# Patient Record
Sex: FEMALE | Race: WHITE | NOT HISPANIC OR LATINO
[De-identification: names, ages, dates, MRNs, and addresses within clinical notes are randomized per-mention and may not be internally consistent; named-entity substitution may affect disease eponyms.]

---

## 2022-01-03 ENCOUNTER — APPOINTMENT (OUTPATIENT)
Dept: INTERNAL MEDICINE | Facility: CLINIC | Age: 37
End: 2022-01-03
Payer: COMMERCIAL

## 2022-01-03 ENCOUNTER — TRANSCRIPTION ENCOUNTER (OUTPATIENT)
Age: 37
End: 2022-01-03

## 2022-01-03 VITALS — WEIGHT: 125 LBS | BODY MASS INDEX: 20.09 KG/M2 | HEIGHT: 66 IN

## 2022-01-03 VITALS
HEART RATE: 90 BPM | DIASTOLIC BLOOD PRESSURE: 62 MMHG | RESPIRATION RATE: 16 BRPM | SYSTOLIC BLOOD PRESSURE: 90 MMHG | OXYGEN SATURATION: 98 %

## 2022-01-03 DIAGNOSIS — Z78.9 OTHER SPECIFIED HEALTH STATUS: ICD-10-CM

## 2022-01-03 DIAGNOSIS — Z82.0 FAMILY HISTORY OF EPILEPSY AND OTHER DISEASES OF THE NERVOUS SYSTEM: ICD-10-CM

## 2022-01-03 PROCEDURE — 99385 PREV VISIT NEW AGE 18-39: CPT

## 2022-01-03 PROCEDURE — 36415 COLL VENOUS BLD VENIPUNCTURE: CPT

## 2022-01-03 NOTE — HISTORY OF PRESENT ILLNESS
[de-identified] : This is a 37 yo f here to establish care.\par Currently undergoing IVF. \par Started on levothyroxine.  as pasrt of her treatment\par takes a lot of vitamins- prenatal    \par \par a lot of stress with work.  \par healthy diet\par walks a lot\par used to play squash regularly.

## 2022-01-04 LAB
25(OH)D3 SERPL-MCNC: 43.6 NG/ML
ALBUMIN SERPL ELPH-MCNC: 4.9 G/DL
ALP BLD-CCNC: 34 U/L
ALT SERPL-CCNC: 22 U/L
ANION GAP SERPL CALC-SCNC: 16 MMOL/L
AST SERPL-CCNC: 15 U/L
BASOPHILS # BLD AUTO: 0.03 K/UL
BASOPHILS NFR BLD AUTO: 0.5 %
BILIRUB SERPL-MCNC: 0.5 MG/DL
BUN SERPL-MCNC: 7 MG/DL
CALCIUM SERPL-MCNC: 9.6 MG/DL
CHLORIDE SERPL-SCNC: 102 MMOL/L
CHOLEST SERPL-MCNC: 189 MG/DL
CO2 SERPL-SCNC: 23 MMOL/L
CREAT SERPL-MCNC: 0.71 MG/DL
EOSINOPHIL # BLD AUTO: 0.04 K/UL
EOSINOPHIL NFR BLD AUTO: 0.7 %
ESTIMATED AVERAGE GLUCOSE: 94 MG/DL
GLUCOSE SERPL-MCNC: 92 MG/DL
HBA1C MFR BLD HPLC: 4.9 %
HCT VFR BLD CALC: 39.9 %
HDLC SERPL-MCNC: 72 MG/DL
HGB BLD-MCNC: 12.9 G/DL
IMM GRANULOCYTES NFR BLD AUTO: 0.2 %
LDLC SERPL CALC-MCNC: 89 MG/DL
LYMPHOCYTES # BLD AUTO: 1.42 K/UL
LYMPHOCYTES NFR BLD AUTO: 23.7 %
MAN DIFF?: NORMAL
MCHC RBC-ENTMCNC: 30.6 PG
MCHC RBC-ENTMCNC: 32.3 GM/DL
MCV RBC AUTO: 94.8 FL
MONOCYTES # BLD AUTO: 0.3 K/UL
MONOCYTES NFR BLD AUTO: 5 %
NEUTROPHILS # BLD AUTO: 4.19 K/UL
NEUTROPHILS NFR BLD AUTO: 69.9 %
NONHDLC SERPL-MCNC: 117 MG/DL
PLATELET # BLD AUTO: 185 K/UL
POTASSIUM SERPL-SCNC: 4 MMOL/L
PROT SERPL-MCNC: 7.3 G/DL
RBC # BLD: 4.21 M/UL
RBC # FLD: 13.7 %
SODIUM SERPL-SCNC: 141 MMOL/L
TRIGL SERPL-MCNC: 141 MG/DL
WBC # FLD AUTO: 5.99 K/UL

## 2022-01-10 ENCOUNTER — TRANSCRIPTION ENCOUNTER (OUTPATIENT)
Age: 37
End: 2022-01-10

## 2022-04-29 ENCOUNTER — TRANSCRIPTION ENCOUNTER (OUTPATIENT)
Age: 37
End: 2022-04-29

## 2023-03-01 ENCOUNTER — INPATIENT (INPATIENT)
Facility: HOSPITAL | Age: 38
LOS: 2 days | Discharge: HOME | End: 2023-03-04
Attending: OBSTETRICS & GYNECOLOGY | Admitting: OBSTETRICS & GYNECOLOGY
Payer: COMMERCIAL

## 2023-03-02 VITALS — HEIGHT: 66 IN | WEIGHT: 156.97 LBS

## 2023-03-02 LAB
BASOPHILS # BLD AUTO: 0.02 K/UL — SIGNIFICANT CHANGE UP (ref 0–0.2)
BASOPHILS # BLD AUTO: 0.02 K/UL — SIGNIFICANT CHANGE UP (ref 0–0.2)
BASOPHILS NFR BLD AUTO: 0.1 % — SIGNIFICANT CHANGE UP (ref 0–2)
BASOPHILS NFR BLD AUTO: 0.2 % — SIGNIFICANT CHANGE UP (ref 0–2)
BLD GP AB SCN SERPL QL: POSITIVE — SIGNIFICANT CHANGE UP
COVID-19 SPIKE DOMAIN AB INTERP: POSITIVE
COVID-19 SPIKE DOMAIN ANTIBODY RESULT: >250 U/ML — HIGH
EOSINOPHIL # BLD AUTO: 0.01 K/UL — SIGNIFICANT CHANGE UP (ref 0–0.5)
EOSINOPHIL # BLD AUTO: 0.17 K/UL — SIGNIFICANT CHANGE UP (ref 0–0.5)
EOSINOPHIL NFR BLD AUTO: 0.1 % — SIGNIFICANT CHANGE UP (ref 0–6)
EOSINOPHIL NFR BLD AUTO: 1.9 % — SIGNIFICANT CHANGE UP (ref 0–6)
HBV SURFACE AG SER-ACNC: SIGNIFICANT CHANGE UP
HCT VFR BLD CALC: 31.5 % — LOW (ref 34.5–45)
HCT VFR BLD CALC: 36.5 % — SIGNIFICANT CHANGE UP (ref 34.5–45)
HGB BLD-MCNC: 10.6 G/DL — LOW (ref 11.5–15.5)
HGB BLD-MCNC: 12.4 G/DL — SIGNIFICANT CHANGE UP (ref 11.5–15.5)
IMM GRANULOCYTES NFR BLD AUTO: 0.4 % — SIGNIFICANT CHANGE UP (ref 0–0.9)
IMM GRANULOCYTES NFR BLD AUTO: 0.7 % — SIGNIFICANT CHANGE UP (ref 0–0.9)
LYMPHOCYTES # BLD AUTO: 0.79 K/UL — LOW (ref 1–3.3)
LYMPHOCYTES # BLD AUTO: 2.26 K/UL — SIGNIFICANT CHANGE UP (ref 1–3.3)
LYMPHOCYTES # BLD AUTO: 25.9 % — SIGNIFICANT CHANGE UP (ref 13–44)
LYMPHOCYTES # BLD AUTO: 4.6 % — LOW (ref 13–44)
MCHC RBC-ENTMCNC: 31.8 PG — SIGNIFICANT CHANGE UP (ref 27–34)
MCHC RBC-ENTMCNC: 32 PG — SIGNIFICANT CHANGE UP (ref 27–34)
MCHC RBC-ENTMCNC: 33.7 GM/DL — SIGNIFICANT CHANGE UP (ref 32–36)
MCHC RBC-ENTMCNC: 34 GM/DL — SIGNIFICANT CHANGE UP (ref 32–36)
MCV RBC AUTO: 94.1 FL — SIGNIFICANT CHANGE UP (ref 80–100)
MCV RBC AUTO: 94.6 FL — SIGNIFICANT CHANGE UP (ref 80–100)
MONOCYTES # BLD AUTO: 0.6 K/UL — SIGNIFICANT CHANGE UP (ref 0–0.9)
MONOCYTES # BLD AUTO: 0.7 K/UL — SIGNIFICANT CHANGE UP (ref 0–0.9)
MONOCYTES NFR BLD AUTO: 4.1 % — SIGNIFICANT CHANGE UP (ref 2–14)
MONOCYTES NFR BLD AUTO: 6.9 % — SIGNIFICANT CHANGE UP (ref 2–14)
NEUTROPHILS # BLD AUTO: 15.52 K/UL — HIGH (ref 1.8–7.4)
NEUTROPHILS # BLD AUTO: 5.63 K/UL — SIGNIFICANT CHANGE UP (ref 1.8–7.4)
NEUTROPHILS NFR BLD AUTO: 64.4 % — SIGNIFICANT CHANGE UP (ref 43–77)
NEUTROPHILS NFR BLD AUTO: 90.7 % — HIGH (ref 43–77)
NRBC # BLD: 0 /100 WBCS — SIGNIFICANT CHANGE UP (ref 0–0)
NRBC # BLD: 0 /100 WBCS — SIGNIFICANT CHANGE UP (ref 0–0)
PLATELET # BLD AUTO: 169 K/UL — SIGNIFICANT CHANGE UP (ref 150–400)
PLATELET # BLD AUTO: 206 K/UL — SIGNIFICANT CHANGE UP (ref 150–400)
RBC # BLD: 3.33 M/UL — LOW (ref 3.8–5.2)
RBC # BLD: 3.88 M/UL — SIGNIFICANT CHANGE UP (ref 3.8–5.2)
RBC # FLD: 13 % — SIGNIFICANT CHANGE UP (ref 10.3–14.5)
RBC # FLD: 13.1 % — SIGNIFICANT CHANGE UP (ref 10.3–14.5)
RH IG SCN BLD-IMP: NEGATIVE — SIGNIFICANT CHANGE UP
RH IG SCN BLD-IMP: NEGATIVE — SIGNIFICANT CHANGE UP
SARS-COV-2 IGG+IGM SERPL QL IA: >250 U/ML — HIGH
SARS-COV-2 IGG+IGM SERPL QL IA: POSITIVE
T PALLIDUM AB TITR SER: NEGATIVE — SIGNIFICANT CHANGE UP
WBC # BLD: 17.1 K/UL — HIGH (ref 3.8–10.5)
WBC # BLD: 8.74 K/UL — SIGNIFICANT CHANGE UP (ref 3.8–10.5)
WBC # FLD AUTO: 17.1 K/UL — HIGH (ref 3.8–10.5)
WBC # FLD AUTO: 8.74 K/UL — SIGNIFICANT CHANGE UP (ref 3.8–10.5)

## 2023-03-02 PROCEDURE — 86077 PHYS BLOOD BANK SERV XMATCH: CPT

## 2023-03-02 RX ORDER — OXYTOCIN 10 UNIT/ML
2 VIAL (ML) INJECTION
Qty: 30 | Refills: 0 | Status: DISCONTINUED | OUTPATIENT
Start: 2023-03-02 | End: 2023-03-02

## 2023-03-02 RX ORDER — LANOLIN
1 OINTMENT (GRAM) TOPICAL EVERY 6 HOURS
Refills: 0 | Status: DISCONTINUED | OUTPATIENT
Start: 2023-03-02 | End: 2023-03-04

## 2023-03-02 RX ORDER — CEFAZOLIN SODIUM 1 G
2000 VIAL (EA) INJECTION ONCE
Refills: 0 | Status: COMPLETED | OUTPATIENT
Start: 2023-03-02 | End: 2023-03-02

## 2023-03-02 RX ORDER — ACETAMINOPHEN 500 MG
1000 TABLET ORAL ONCE
Refills: 0 | Status: COMPLETED | OUTPATIENT
Start: 2023-03-02 | End: 2023-03-02

## 2023-03-02 RX ORDER — AZITHROMYCIN 500 MG/1
500 TABLET, FILM COATED ORAL ONCE
Refills: 0 | Status: COMPLETED | OUTPATIENT
Start: 2023-03-02 | End: 2023-03-02

## 2023-03-02 RX ORDER — CHLORHEXIDINE GLUCONATE 213 G/1000ML
1 SOLUTION TOPICAL ONCE
Refills: 0 | Status: DISCONTINUED | OUTPATIENT
Start: 2023-03-02 | End: 2023-03-02

## 2023-03-02 RX ORDER — OXYTOCIN 10 UNIT/ML
333.33 VIAL (ML) INJECTION
Qty: 20 | Refills: 0 | Status: DISCONTINUED | OUTPATIENT
Start: 2023-03-02 | End: 2023-03-04

## 2023-03-02 RX ORDER — OXYCODONE HYDROCHLORIDE 5 MG/1
5 TABLET ORAL ONCE
Refills: 0 | Status: DISCONTINUED | OUTPATIENT
Start: 2023-03-02 | End: 2023-03-04

## 2023-03-02 RX ORDER — CHLORHEXIDINE GLUCONATE 213 G/1000ML
1 SOLUTION TOPICAL ONCE
Refills: 0 | Status: COMPLETED | OUTPATIENT
Start: 2023-03-02 | End: 2023-03-02

## 2023-03-02 RX ORDER — KETOROLAC TROMETHAMINE 30 MG/ML
30 SYRINGE (ML) INJECTION EVERY 6 HOURS
Refills: 0 | Status: DISCONTINUED | OUTPATIENT
Start: 2023-03-02 | End: 2023-03-03

## 2023-03-02 RX ORDER — TETANUS TOXOID, REDUCED DIPHTHERIA TOXOID AND ACELLULAR PERTUSSIS VACCINE, ADSORBED 5; 2.5; 8; 8; 2.5 [IU]/.5ML; [IU]/.5ML; UG/.5ML; UG/.5ML; UG/.5ML
0.5 SUSPENSION INTRAMUSCULAR ONCE
Refills: 0 | Status: DISCONTINUED | OUTPATIENT
Start: 2023-03-02 | End: 2023-03-04

## 2023-03-02 RX ORDER — IBUPROFEN 200 MG
600 TABLET ORAL EVERY 6 HOURS
Refills: 0 | Status: COMPLETED | OUTPATIENT
Start: 2023-03-02 | End: 2024-01-29

## 2023-03-02 RX ORDER — OXYCODONE HYDROCHLORIDE 5 MG/1
5 TABLET ORAL
Refills: 0 | Status: DISCONTINUED | OUTPATIENT
Start: 2023-03-02 | End: 2023-03-04

## 2023-03-02 RX ORDER — MAGNESIUM HYDROXIDE 400 MG/1
30 TABLET, CHEWABLE ORAL
Refills: 0 | Status: DISCONTINUED | OUTPATIENT
Start: 2023-03-02 | End: 2023-03-04

## 2023-03-02 RX ORDER — CITRIC ACID/SODIUM CITRATE 300-500 MG
30 SOLUTION, ORAL ORAL ONCE
Refills: 0 | Status: COMPLETED | OUTPATIENT
Start: 2023-03-02 | End: 2023-03-02

## 2023-03-02 RX ORDER — OXYTOCIN 10 UNIT/ML
2 VIAL (ML) INJECTION
Qty: 30 | Refills: 0 | Status: DISCONTINUED | OUTPATIENT
Start: 2023-03-02 | End: 2023-03-04

## 2023-03-02 RX ORDER — DIPHENHYDRAMINE HCL 50 MG
25 CAPSULE ORAL EVERY 6 HOURS
Refills: 0 | Status: DISCONTINUED | OUTPATIENT
Start: 2023-03-02 | End: 2023-03-04

## 2023-03-02 RX ORDER — SIMETHICONE 80 MG/1
80 TABLET, CHEWABLE ORAL EVERY 4 HOURS
Refills: 0 | Status: DISCONTINUED | OUTPATIENT
Start: 2023-03-02 | End: 2023-03-04

## 2023-03-02 RX ORDER — CITRIC ACID/SODIUM CITRATE 300-500 MG
15 SOLUTION, ORAL ORAL EVERY 6 HOURS
Refills: 0 | Status: DISCONTINUED | OUTPATIENT
Start: 2023-03-02 | End: 2023-03-02

## 2023-03-02 RX ORDER — SODIUM CHLORIDE 9 MG/ML
1000 INJECTION, SOLUTION INTRAVENOUS
Refills: 0 | Status: DISCONTINUED | OUTPATIENT
Start: 2023-03-02 | End: 2023-03-02

## 2023-03-02 RX ORDER — SODIUM CHLORIDE 9 MG/ML
1000 INJECTION, SOLUTION INTRAVENOUS
Refills: 0 | Status: DISCONTINUED | OUTPATIENT
Start: 2023-03-02 | End: 2023-03-04

## 2023-03-02 RX ORDER — ACETAMINOPHEN 500 MG
975 TABLET ORAL
Refills: 0 | Status: DISCONTINUED | OUTPATIENT
Start: 2023-03-02 | End: 2023-03-04

## 2023-03-02 RX ADMIN — CHLORHEXIDINE GLUCONATE 1 APPLICATION(S): 213 SOLUTION TOPICAL at 15:00

## 2023-03-02 RX ADMIN — Medication 400 MILLIGRAM(S): at 20:46

## 2023-03-02 RX ADMIN — SODIUM CHLORIDE 125 MILLILITER(S): 9 INJECTION, SOLUTION INTRAVENOUS at 22:50

## 2023-03-02 RX ADMIN — SODIUM CHLORIDE 125 MILLILITER(S): 9 INJECTION, SOLUTION INTRAVENOUS at 01:55

## 2023-03-02 RX ADMIN — Medication 30 MILLIGRAM(S): at 23:44

## 2023-03-02 RX ADMIN — Medication 30 MILLILITER(S): at 16:10

## 2023-03-02 RX ADMIN — AZITHROMYCIN 255 MILLIGRAM(S): 500 TABLET, FILM COATED ORAL at 15:25

## 2023-03-02 RX ADMIN — Medication 2 MILLIUNIT(S)/MIN: at 02:15

## 2023-03-02 RX ADMIN — Medication 100 MILLIGRAM(S): at 15:00

## 2023-03-02 RX ADMIN — Medication 30 MILLIGRAM(S): at 17:34

## 2023-03-02 RX ADMIN — Medication 1000 MILLIUNIT(S)/MIN: at 18:06

## 2023-03-02 NOTE — PRE-ANESTHESIA EVALUATION ADULT - NSANTHOSAYNRD_GEN_A_CORE
No. ALBETR screening performed.  STOP BANG Legend: 0-2 = LOW Risk; 3-4 = INTERMEDIATE Risk; 5-8 = HIGH Risk

## 2023-03-02 NOTE — PATIENT PROFILE OB - FALL HARM RISK - UNIVERSAL INTERVENTIONS
Bed in lowest position, wheels locked, appropriate side rails in place/Call bell, personal items and telephone in reach/Instruct patient to call for assistance before getting out of bed or chair/Non-slip footwear when patient is out of bed/[place] to call system/Physically safe environment - no spills, clutter or unnecessary equipment/Purposeful Proactive Rounding/Room/bathroom lighting operational, light cord in reach

## 2023-03-03 ENCOUNTER — TRANSCRIPTION ENCOUNTER (OUTPATIENT)
Age: 38
End: 2023-03-03

## 2023-03-03 LAB
BASOPHILS # BLD AUTO: 0.02 K/UL — SIGNIFICANT CHANGE UP (ref 0–0.2)
BASOPHILS NFR BLD AUTO: 0.1 % — SIGNIFICANT CHANGE UP (ref 0–2)
EOSINOPHIL # BLD AUTO: 0.02 K/UL — SIGNIFICANT CHANGE UP (ref 0–0.5)
EOSINOPHIL NFR BLD AUTO: 0.1 % — SIGNIFICANT CHANGE UP (ref 0–6)
HCT VFR BLD CALC: 23.4 % — LOW (ref 34.5–45)
HGB BLD-MCNC: 7.9 G/DL — LOW (ref 11.5–15.5)
IMM GRANULOCYTES NFR BLD AUTO: 0.6 % — SIGNIFICANT CHANGE UP (ref 0–0.9)
KLEIHAUER-BETKE CALCULATION: 0 % — SIGNIFICANT CHANGE UP (ref 0–0.3)
LYMPHOCYTES # BLD AUTO: 1.93 K/UL — SIGNIFICANT CHANGE UP (ref 1–3.3)
LYMPHOCYTES # BLD AUTO: 12.7 % — LOW (ref 13–44)
MCHC RBC-ENTMCNC: 32 PG — SIGNIFICANT CHANGE UP (ref 27–34)
MCHC RBC-ENTMCNC: 33.8 GM/DL — SIGNIFICANT CHANGE UP (ref 32–36)
MCV RBC AUTO: 94.7 FL — SIGNIFICANT CHANGE UP (ref 80–100)
MONOCYTES # BLD AUTO: 0.95 K/UL — HIGH (ref 0–0.9)
MONOCYTES NFR BLD AUTO: 6.3 % — SIGNIFICANT CHANGE UP (ref 2–14)
NEUTROPHILS # BLD AUTO: 12.14 K/UL — HIGH (ref 1.8–7.4)
NEUTROPHILS NFR BLD AUTO: 80.2 % — HIGH (ref 43–77)
NRBC # BLD: 0 /100 WBCS — SIGNIFICANT CHANGE UP (ref 0–0)
PLATELET # BLD AUTO: 157 K/UL — SIGNIFICANT CHANGE UP (ref 150–400)
RBC # BLD: 2.47 M/UL — LOW (ref 3.8–5.2)
RBC # FLD: 13.2 % — SIGNIFICANT CHANGE UP (ref 10.3–14.5)
RUBV IGG SER-ACNC: 1.4 INDEX — SIGNIFICANT CHANGE UP
RUBV IGG SER-IMP: POSITIVE — SIGNIFICANT CHANGE UP
WBC # BLD: 15.15 K/UL — HIGH (ref 3.8–10.5)
WBC # FLD AUTO: 15.15 K/UL — HIGH (ref 3.8–10.5)

## 2023-03-03 RX ORDER — IBUPROFEN 200 MG
1 TABLET ORAL
Qty: 0 | Refills: 0 | DISCHARGE
Start: 2023-03-03

## 2023-03-03 RX ORDER — IRON SUCROSE 20 MG/ML
300 INJECTION, SOLUTION INTRAVENOUS EVERY 24 HOURS
Refills: 0 | Status: DISCONTINUED | OUTPATIENT
Start: 2023-03-03 | End: 2023-03-04

## 2023-03-03 RX ORDER — ASCORBIC ACID 60 MG
500 TABLET,CHEWABLE ORAL DAILY
Refills: 0 | Status: DISCONTINUED | OUTPATIENT
Start: 2023-03-03 | End: 2023-03-04

## 2023-03-03 RX ORDER — FOLIC ACID 0.8 MG
0.5 TABLET ORAL DAILY
Refills: 0 | Status: DISCONTINUED | OUTPATIENT
Start: 2023-03-03 | End: 2023-03-04

## 2023-03-03 RX ORDER — IBUPROFEN 200 MG
600 TABLET ORAL EVERY 6 HOURS
Refills: 0 | Status: DISCONTINUED | OUTPATIENT
Start: 2023-03-03 | End: 2023-03-04

## 2023-03-03 RX ORDER — ACETAMINOPHEN 500 MG
3 TABLET ORAL
Qty: 0 | Refills: 0 | DISCHARGE
Start: 2023-03-03

## 2023-03-03 RX ORDER — LIDOCAINE 4 G/100G
1 CREAM TOPICAL ONCE
Refills: 0 | Status: COMPLETED | OUTPATIENT
Start: 2023-03-03 | End: 2023-03-03

## 2023-03-03 RX ORDER — PREGABALIN 225 MG/1
1000 CAPSULE ORAL ONCE
Refills: 0 | Status: COMPLETED | OUTPATIENT
Start: 2023-03-03 | End: 2023-03-03

## 2023-03-03 RX ADMIN — Medication 1 APPLICATION(S): at 05:26

## 2023-03-03 RX ADMIN — Medication 975 MILLIGRAM(S): at 15:47

## 2023-03-03 RX ADMIN — Medication 0.5 MILLIGRAM(S): at 15:49

## 2023-03-03 RX ADMIN — Medication 30 MILLIGRAM(S): at 13:30

## 2023-03-03 RX ADMIN — Medication 30 MILLIGRAM(S): at 12:48

## 2023-03-03 RX ADMIN — LIDOCAINE 1 PATCH: 4 CREAM TOPICAL at 18:50

## 2023-03-03 RX ADMIN — Medication 600 MILLIGRAM(S): at 19:30

## 2023-03-03 RX ADMIN — IRON SUCROSE 176.67 MILLIGRAM(S): 20 INJECTION, SOLUTION INTRAVENOUS at 15:52

## 2023-03-03 RX ADMIN — Medication 30 MILLIGRAM(S): at 00:38

## 2023-03-03 RX ADMIN — Medication 975 MILLIGRAM(S): at 21:32

## 2023-03-03 RX ADMIN — Medication 975 MILLIGRAM(S): at 03:00

## 2023-03-03 RX ADMIN — Medication 500 MILLIGRAM(S): at 18:45

## 2023-03-03 RX ADMIN — Medication 975 MILLIGRAM(S): at 10:32

## 2023-03-03 RX ADMIN — Medication 975 MILLIGRAM(S): at 16:30

## 2023-03-03 RX ADMIN — Medication 975 MILLIGRAM(S): at 02:04

## 2023-03-03 RX ADMIN — Medication 975 MILLIGRAM(S): at 22:30

## 2023-03-03 RX ADMIN — Medication 600 MILLIGRAM(S): at 18:43

## 2023-03-03 RX ADMIN — LIDOCAINE 1 PATCH: 4 CREAM TOPICAL at 18:49

## 2023-03-03 RX ADMIN — Medication 975 MILLIGRAM(S): at 09:41

## 2023-03-03 RX ADMIN — Medication 30 MILLIGRAM(S): at 05:26

## 2023-03-03 RX ADMIN — PREGABALIN 1000 MICROGRAM(S): 225 CAPSULE ORAL at 18:48

## 2023-03-03 NOTE — DISCHARGE NOTE OB - NS MD DC FALL RISK RISK
For information on Fall & Injury Prevention, visit: [email or web address] OR  [email or web address] OR  https://www.cdc.gov/steadi/patient.html

## 2023-03-03 NOTE — DISCHARGE NOTE OB - CARE PLAN
1 Principal Discharge DX:	 delivery delivered  Assessment and plan of treatment:	 delivery, meeting all postoperative milestones.  Please follow-up with your OB doctor within 1-2 weeks.  You can resume a regular diet at home and may continue your prenatal vitamins as directed.  Please place nothing in the vagina for 6 weeks (no tampons, sex, douching, tub baths, swimming pools, etc).  If you have severe headaches and/or vision changes, heavy bleeding, or chest pain, please call your provider or go to the nearest Emergency Department.  Please call your OB with any signs of symptoms of infection including fever > 100.4 degrees, severe pain, malodorous vaginal discharge or heavy bleeding requiring more than 1-2 pads/hour.  You can take Motrin 600mg orally every 6 hours for pain as needed.  Secondary Diagnosis:	Acute postoperative anemia due to expected blood loss

## 2023-03-03 NOTE — CHART NOTE - NSCHARTNOTEFT_GEN_A_CORE
Alerted by primary RN that patient is c/o neck pain.  She is POD#1 s/p primary  section for arrest of dilation c/o neck pain since delivery, worse this morning and today.  Pain is constant and non-radiating.  It is worsened with movement and she refers to it as "debilitating."  Heat packs and oral pain medications improve the pain only temporarily.  Denies headache, neck injury, hx of neck/back pain, dizziness, chest pain, SOB, back pain.    VS wnl  Vital Signs Last 24 Hrs  T(C): 36.8 (03 Mar 2023 05:48), Max: 36.9 (03 Mar 2023 01:59)  T(F): 98.3 (03 Mar 2023 05:48), Max: 98.4 (03 Mar 2023 01:59)  HR: 68 (03 Mar 2023 05:48) (64 - 84)  BP: 96/62 (03 Mar 2023 05:48) (96/62 - 125/77)  BP(mean): --  RR: 18 (03 Mar 2023 05:48) (12 - 18)  SpO2: 97% (03 Mar 2023 05:48) (95% - 100%)    Gen: patient looks well in bed in NAD  MS: no edema, erythema, or tenderness to the cervical spine or paravertebral muscles        Likely a strained muscle due to tension/stress.  Suggested topical lidocaine patch or Flexeril for severe pain as needed.  Will follow up with attending.

## 2023-03-03 NOTE — PROGRESS NOTE ADULT - ASSESSMENT
A/P   37y  s/p  section c/b PPH (EBL 1200), POD #1, stable  - PPH: s/p methergine intra-op, bleeding well controlled this AM  -  Pain: PO motrin and Tylenol, oxycodone for severe pain PRN  -  GI: tolerating clears, passing gas, ADAT  -  : argueta in situ; DC this AM, f/u TOV  -  DVT prophylaxis: ambulation, SCDs, SQL  -  Dispo: POD 3 or 4

## 2023-03-03 NOTE — LACTATION INITIAL EVALUATION - NS LACT CON REASON FOR REQ
40.1 week baby girl seen at 18 hours of life. Voiding. Stooling. This is mother's first baby. Mother planned to bf this baby and is opened to bottle feeding if she desires. At  arrival, baby was swaddled in crib. Baby was fed with formula overnight and is sleepy right now. Mother states baby "briefly latched at 8am". Mother has bilateral medium br. Medium everted nipples, skin dry and intact. HE education given with demonstration, + expressible colotrum noted. Baby is unswaddled and awake showing early signs of hunger. Mother was assisted to latch baby with football hold to left breast with some assist. Baby stays asleep for the breastfeeding session. Normal newbonr behavior explained to mother. In case of baby being sleepy, mother encouraged to perform HE to feed baby drops of colostrum. Complete bf education given. Frequent/ responsive feeding and SSC encouraged. Mother verbalizes understanding of the above. All questions answered. Plan as above communicated to primary RN. Tele lactation consult referred for followup upon d/c./primaparous mom

## 2023-03-03 NOTE — DISCHARGE NOTE OB - CARE PROVIDER_API CALL
Yolanda Park  OBSTETRICS AND GYNECOLOGY  [address] [place] [id number]  Phone: [phone]  Fax: [phone]  Follow Up Time: 2 weeks

## 2023-03-03 NOTE — DISCHARGE NOTE OB - PATIENT PORTAL LINK FT
You can access the FollowMyHealth Patient Portal offered by [facility] by registering at the following website: [email or web address]. By joining [facility]’s FollowMyHealth portal, you will also be able to view your health information using other applications (apps) compatible with our system.

## 2023-03-03 NOTE — DISCHARGE NOTE OB - HOSPITAL COURSE
81
Admitted for induction of labor.  Delivered via primary  section due to arrest of dilation.  Uncomplicated surgery and postoperative course.  Acute blood loss anemia noted on post-operative CBC.  Patient stable with normal vital signs.  No intervention necessary.

## 2023-03-03 NOTE — LACTATION INITIAL EVALUATION - LACTATION INTERVENTIONS
initiate/review safe skin-to-skin/initiate/review hand expression/initiate/review techniques for position and latch/initiate/review supplementation plan due to medical indications/initiate/review nipple shield use/reviewed components of an effective feeding and at least 8 effective feedings per day required/reviewed importance of monitoring infant diapers, the breastfeeding log, and minimum output each day/reviewed benefits and recommendations for rooming in/reviewed feeding on demand/by cue at least 8 times a day

## 2023-03-04 VITALS
HEART RATE: 70 BPM | RESPIRATION RATE: 18 BRPM | OXYGEN SATURATION: 97 % | SYSTOLIC BLOOD PRESSURE: 116 MMHG | DIASTOLIC BLOOD PRESSURE: 75 MMHG | TEMPERATURE: 98 F

## 2023-03-04 PROCEDURE — 86880 COOMBS TEST DIRECT: CPT

## 2023-03-04 PROCEDURE — 86850 RBC ANTIBODY SCREEN: CPT

## 2023-03-04 PROCEDURE — 86780 TREPONEMA PALLIDUM: CPT

## 2023-03-04 PROCEDURE — 86769 SARS-COV-2 COVID-19 ANTIBODY: CPT

## 2023-03-04 PROCEDURE — 86900 BLOOD TYPING SEROLOGIC ABO: CPT

## 2023-03-04 PROCEDURE — 85025 COMPLETE CBC W/AUTO DIFF WBC: CPT

## 2023-03-04 PROCEDURE — 86870 RBC ANTIBODY IDENTIFICATION: CPT

## 2023-03-04 PROCEDURE — 87340 HEPATITIS B SURFACE AG IA: CPT

## 2023-03-04 PROCEDURE — 86901 BLOOD TYPING SEROLOGIC RH(D): CPT

## 2023-03-04 PROCEDURE — 59050 FETAL MONITOR W/REPORT: CPT

## 2023-03-04 PROCEDURE — 36415 COLL VENOUS BLD VENIPUNCTURE: CPT

## 2023-03-04 PROCEDURE — 85460 HEMOGLOBIN FETAL: CPT

## 2023-03-04 PROCEDURE — 86762 RUBELLA ANTIBODY: CPT

## 2023-03-04 RX ADMIN — Medication 975 MILLIGRAM(S): at 18:28

## 2023-03-04 RX ADMIN — Medication 600 MILLIGRAM(S): at 15:25

## 2023-03-04 RX ADMIN — Medication 600 MILLIGRAM(S): at 10:00

## 2023-03-04 RX ADMIN — LIDOCAINE 1 PATCH: 4 CREAM TOPICAL at 06:10

## 2023-03-04 RX ADMIN — Medication 600 MILLIGRAM(S): at 16:15

## 2023-03-04 RX ADMIN — Medication 975 MILLIGRAM(S): at 04:33

## 2023-03-04 RX ADMIN — Medication 600 MILLIGRAM(S): at 09:02

## 2023-03-04 RX ADMIN — Medication 0.5 MILLIGRAM(S): at 12:43

## 2023-03-04 RX ADMIN — Medication 500 MILLIGRAM(S): at 12:44

## 2023-03-04 RX ADMIN — Medication 975 MILLIGRAM(S): at 13:30

## 2023-03-04 RX ADMIN — Medication 975 MILLIGRAM(S): at 12:44

## 2023-03-04 RX ADMIN — Medication 975 MILLIGRAM(S): at 05:30

## 2023-03-04 NOTE — PROGRESS NOTE ADULT - ASSESSMENT
A/P: 37y s/p  section POD#2, stable  -  Pain: PO motrin and Tylenol, oxycodone for severe pain PRN  -  GI: tolerating regular diet, passing gas  -  : s/p argueta , urinating without difficulty  -  DVT prophylaxis: encouraged increased ambulation, SCDs, SQL  -  Dispo: POD 3 or 4 A/P   37y  s/p  section c/b PPH (EBL 1200), POD #2, stable  - PPH: s/p methergine intra-op, bleeding well controlled postpartum  -  Pain: PO motrin and Tylenol, oxycodone for severe pain PRN  -  GI: tolerating regular diet, passing gas  -  : s/p argueta , urinating without difficulty  -  DVT prophylaxis: encouraged increased ambulation, SCDs, SQL  -  Dispo: POD 3 or 4

## 2023-03-04 NOTE — PROGRESS NOTE ADULT - ATTENDING COMMENTS
Agree with 's documentation   Pt is doing well except she complains of neck stiffness and pain. Denies headache. She can not turn the neck and if she does it causes significant pain and affects on breast feeding or look down on baby.   Exam done. This does not seem to be associated with epidural or surgery but more like musculoskeletal strain due to previous positioning.  No other focal neurologic defect and no peripheral neurologic symptoms. Denies HA  Advised to observe with warm compression and lido patch. Stretch as you can. If worsening or no improvement at all, I will reconsider having anesthesia input.    H/H 12.4 to 7.9 this am. Pt ambulating without symptoms and verbalize feeling well. Bloodless protocol with IV iron.   Baby girl latching well and glc is controlled. Baby was in the nursery overnight and fed by bottle few times.  LGA  Immune to R/V/Measles/Mumps.   Post operatively doing well. Follow up her neck later     -----------------  Lido patch was given in pm. Still feeling but has mild relief. Will observe till tomorrow
POD#2 primary CS for arrest c/b PPH   Hb 7.9 PPD#1 from 12.4. Doing well. Ambulating without anemic symptoms.   Reports neck pain is improved compared to yesterday, thinks straining of muscle.   Still denies headache and can move her head better today.   Baby was transferred to NICU overnight because of TTN and desat. Spoke with neonatology and no apnea.   Since she lives in one block, she would like to go home today.  Breastfeeding without difficulty  Bleeding minimal   Incision D/C/I  discharge home

## 2023-03-04 NOTE — PROGRESS NOTE ADULT - SUBJECTIVE AND OBJECTIVE BOX
Patient evaluated at bedside this morning, resting comfortable in bed.   She reports pain is well controlled with Tylenol and Motrin.   Reports decrease in amount of vaginal bleeding  She has been ambulating without assistance, voiding spontaneously, passing gas, and tolerating regular diet without nausea/vomiting.    Physical Exam:  Vital Signs Last 24 Hrs  T(C): 36.7 (04 Mar 2023 04:45), Max: 36.8 (03 Mar 2023 05:48)  T(F): 98 (04 Mar 2023 04:45), Max: 98.3 (03 Mar 2023 05:48)  HR: 102 (04 Mar 2023 04:45) (68 - 102)  BP: 99/68 (04 Mar 2023 04:45) (96/62 - 106/63)  BP(mean): 78 (03 Mar 2023 22:00) (75 - 78)  RR: 19 (04 Mar 2023 04:45) (16 - 19)  SpO2: 98% (04 Mar 2023 04:45) (96% - 98%)    Parameters below as of 03 Mar 2023 22:00  Patient On (Oxygen Delivery Method): room air        GA: Alert, comfortable, NAD  Pulm: Breathing comfortably on RA  Abd: Uterine fundus firm, tenderness appropriate with post-op state, incision clean/dry/intact  Extremities: no swelling or calf tenderness                             7.9    15.15 )-----------( 157      ( 03 Mar 2023 09:29 )             23.4               
Patient evaluated at bedside this morning, resting comfortably in bed, no acute events overnight.  She reports pain is well controlled with tylenol and motrin.  Reports decrease in amount of vaginal bleeding.  She has not tried ambulating since procedure, argueta remains in place at this time. Tolerating clear liquids.  Has passed flatus.    Physical Exam:  Vital Signs Last 24 Hrs  T(C): 36.9 (03 Mar 2023 01:59), Max: 36.9 (03 Mar 2023 01:59)  T(F): 98.4 (03 Mar 2023 01:59), Max: 98.4 (03 Mar 2023 01:59)  HR: 72 (03 Mar 2023 01:59) (64 - 84)  BP: 108/69 (03 Mar 2023 01:59) (102/69 - 125/77)  BP(mean): --  RR: 18 (03 Mar 2023 01:59) (12 - 18)  SpO2: 96% (03 Mar 2023 01:59) (95% - 100%)    Parameters below as of 02 Mar 2023 16:55  Patient On (Oxygen Delivery Method): room air        GA: NAD, patient is alert and oriented  Resp: No increased work of breathing, breathing comfortably on RA  Abd: Soft, non-distended. Appropriately tender for post-op state. Skin clean/try/and intact.  Extremities: no swelling or calf tenderness                          10.6   17.10 )-----------( 169      ( 02 Mar 2023 18:16 )             31.5               acetaminophen     Tablet .. 975 milliGRAM(s) Oral <User Schedule>  diphenhydrAMINE 25 milliGRAM(s) Oral every 6 hours PRN  diphtheria/tetanus/pertussis (acellular) Vaccine (Adacel) 0.5 milliLiter(s) IntraMuscular once  ibuprofen  Tablet. 600 milliGRAM(s) Oral every 6 hours  ketorolac   Injectable 30 milliGRAM(s) IV Push every 6 hours  lactated ringers. 1000 milliLiter(s) IV Continuous <Continuous>  lanolin Ointment 1 Application(s) Topical every 6 hours PRN  magnesium hydroxide Suspension 30 milliLiter(s) Oral two times a day PRN  oxyCODONE    IR 5 milliGRAM(s) Oral every 3 hours PRN  oxyCODONE    IR 5 milliGRAM(s) Oral once PRN  oxytocin Infusion 333.333 milliUNIT(s)/Min IV Continuous <Continuous>  oxytocin Infusion 333.333 milliUNIT(s)/Min IV Continuous <Continuous>  oxytocin Infusion. 2 milliUNIT(s)/Min IV Continuous <Continuous>  simethicone 80 milliGRAM(s) Chew every 4 hours PRN

## 2023-03-05 ENCOUNTER — EMERGENCY (EMERGENCY)
Facility: HOSPITAL | Age: 38
LOS: 1 days | Discharge: HOME | End: 2023-03-05
Attending: EMERGENCY MEDICINE | Admitting: EMERGENCY MEDICINE
Payer: COMMERCIAL

## 2023-03-05 VITALS
RESPIRATION RATE: 18 BRPM | WEIGHT: 147.05 LBS | DIASTOLIC BLOOD PRESSURE: 83 MMHG | HEIGHT: 66 IN | TEMPERATURE: 98 F | HEART RATE: 72 BPM | OXYGEN SATURATION: 100 % | SYSTOLIC BLOOD PRESSURE: 118 MMHG

## 2023-03-05 VITALS
RESPIRATION RATE: 18 BRPM | TEMPERATURE: 98 F | HEART RATE: 66 BPM | SYSTOLIC BLOOD PRESSURE: 128 MMHG | OXYGEN SATURATION: 100 % | DIASTOLIC BLOOD PRESSURE: 78 MMHG

## 2023-03-05 DIAGNOSIS — M54.2 CERVICALGIA: ICD-10-CM

## 2023-03-05 DIAGNOSIS — E03.9 HYPOTHYROIDISM, UNSPECIFIED: ICD-10-CM

## 2023-03-05 DIAGNOSIS — Z87.42 PERSONAL HISTORY OF OTHER DISEASES OF THE FEMALE GENITAL TRACT: ICD-10-CM

## 2023-03-05 DIAGNOSIS — Z87.59 PERSONAL HISTORY OF OTHER COMPLICATIONS OF PREGNANCY, CHILDBIRTH AND THE PUERPERIUM: ICD-10-CM

## 2023-03-05 DIAGNOSIS — Z91.018 ALLERGY TO OTHER FOODS: ICD-10-CM

## 2023-03-05 DIAGNOSIS — Z20.822 CONTACT WITH AND (SUSPECTED) EXPOSURE TO COVID-19: ICD-10-CM

## 2023-03-05 DIAGNOSIS — R51.9 HEADACHE, UNSPECIFIED: ICD-10-CM

## 2023-03-05 DIAGNOSIS — G97.1 OTHER REACTION TO SPINAL AND LUMBAR PUNCTURE: ICD-10-CM

## 2023-03-05 LAB
ALBUMIN SERPL ELPH-MCNC: 3 G/DL — LOW (ref 3.3–5)
ALP SERPL-CCNC: 105 U/L — SIGNIFICANT CHANGE UP (ref 40–120)
ALT FLD-CCNC: 13 U/L — SIGNIFICANT CHANGE UP (ref 10–45)
ANION GAP SERPL CALC-SCNC: 7 MMOL/L — SIGNIFICANT CHANGE UP (ref 5–17)
APTT BLD: 28.3 SEC — SIGNIFICANT CHANGE UP (ref 27.5–35.5)
AST SERPL-CCNC: 13 U/L — SIGNIFICANT CHANGE UP (ref 10–40)
BILIRUB SERPL-MCNC: 0.2 MG/DL — SIGNIFICANT CHANGE UP (ref 0.2–1.2)
BUN SERPL-MCNC: 8 MG/DL — SIGNIFICANT CHANGE UP (ref 7–23)
CALCIUM SERPL-MCNC: 8.4 MG/DL — SIGNIFICANT CHANGE UP (ref 8.4–10.5)
CHLORIDE SERPL-SCNC: 107 MMOL/L — SIGNIFICANT CHANGE UP (ref 96–108)
CO2 SERPL-SCNC: 24 MMOL/L — SIGNIFICANT CHANGE UP (ref 22–31)
CREAT SERPL-MCNC: 0.58 MG/DL — SIGNIFICANT CHANGE UP (ref 0.5–1.3)
EGFR: 120 ML/MIN/1.73M2 — SIGNIFICANT CHANGE UP
FIBRINOGEN PPP-MCNC: 446 MG/DL — HIGH (ref 258–438)
GLUCOSE SERPL-MCNC: 90 MG/DL — SIGNIFICANT CHANGE UP (ref 70–99)
HCT VFR BLD CALC: 24.6 % — LOW (ref 34.5–45)
HGB BLD-MCNC: 8.3 G/DL — LOW (ref 11.5–15.5)
INR BLD: 0.83 — LOW (ref 0.88–1.16)
LDH SERPL L TO P-CCNC: 182 U/L — SIGNIFICANT CHANGE UP (ref 50–242)
MCHC RBC-ENTMCNC: 32.2 PG — SIGNIFICANT CHANGE UP (ref 27–34)
MCHC RBC-ENTMCNC: 33.7 GM/DL — SIGNIFICANT CHANGE UP (ref 32–36)
MCV RBC AUTO: 95.3 FL — SIGNIFICANT CHANGE UP (ref 80–100)
NRBC # BLD: 0 /100 WBCS — SIGNIFICANT CHANGE UP (ref 0–0)
PLATELET # BLD AUTO: 197 K/UL — SIGNIFICANT CHANGE UP (ref 150–400)
POTASSIUM SERPL-MCNC: 4.1 MMOL/L — SIGNIFICANT CHANGE UP (ref 3.5–5.3)
POTASSIUM SERPL-SCNC: 4.1 MMOL/L — SIGNIFICANT CHANGE UP (ref 3.5–5.3)
PROT SERPL-MCNC: 5.5 G/DL — LOW (ref 6–8.3)
PROTHROM AB SERPL-ACNC: 9.8 SEC — LOW (ref 10.5–13.4)
RBC # BLD: 2.58 M/UL — LOW (ref 3.8–5.2)
RBC # FLD: 13.5 % — SIGNIFICANT CHANGE UP (ref 10.3–14.5)
SARS-COV-2 RNA SPEC QL NAA+PROBE: NEGATIVE — SIGNIFICANT CHANGE UP
SODIUM SERPL-SCNC: 138 MMOL/L — SIGNIFICANT CHANGE UP (ref 135–145)
URATE SERPL-MCNC: 4.1 MG/DL — SIGNIFICANT CHANGE UP (ref 2.5–7)
WBC # BLD: 10.02 K/UL — SIGNIFICANT CHANGE UP (ref 3.8–10.5)
WBC # FLD AUTO: 10.02 K/UL — SIGNIFICANT CHANGE UP (ref 3.8–10.5)

## 2023-03-05 PROCEDURE — 85384 FIBRINOGEN ACTIVITY: CPT

## 2023-03-05 PROCEDURE — 99284 EMERGENCY DEPT VISIT MOD MDM: CPT

## 2023-03-05 PROCEDURE — 85730 THROMBOPLASTIN TIME PARTIAL: CPT

## 2023-03-05 PROCEDURE — 36415 COLL VENOUS BLD VENIPUNCTURE: CPT

## 2023-03-05 PROCEDURE — 85610 PROTHROMBIN TIME: CPT

## 2023-03-05 PROCEDURE — 87635 SARS-COV-2 COVID-19 AMP PRB: CPT

## 2023-03-05 PROCEDURE — 85027 COMPLETE CBC AUTOMATED: CPT

## 2023-03-05 PROCEDURE — 84550 ASSAY OF BLOOD/URIC ACID: CPT

## 2023-03-05 PROCEDURE — 80053 COMPREHEN METABOLIC PANEL: CPT

## 2023-03-05 PROCEDURE — 83615 LACTATE (LD) (LDH) ENZYME: CPT

## 2023-03-05 RX ORDER — SODIUM CHLORIDE 9 MG/ML
1000 INJECTION INTRAMUSCULAR; INTRAVENOUS; SUBCUTANEOUS ONCE
Refills: 0 | Status: COMPLETED | OUTPATIENT
Start: 2023-03-05 | End: 2023-03-05

## 2023-03-05 RX ORDER — LEVOTHYROXINE SODIUM 125 MCG
0 TABLET ORAL
Qty: 0 | Refills: 0 | DISCHARGE

## 2023-03-05 RX ADMIN — SODIUM CHLORIDE 1000 MILLILITER(S): 9 INJECTION INTRAMUSCULAR; INTRAVENOUS; SUBCUTANEOUS at 12:43

## 2023-03-05 NOTE — ED PROVIDER NOTE - PHYSICAL EXAMINATION
VITAL SIGNS: I have reviewed nursing notes and confirm.  CONSTITUTIONAL:  in no acute distress.   SKIN:  warm and dry, no acute rash.   HEAD:  normocephalic, atraumatic.  EYES: PERRL, EOM intact; conjunctiva and sclera clear.  ENT: No nasal discharge; airway clear.   NECK: Supple; non tender.  CARD: S1, S2 normal; no murmurs, gallops, or rubs. Regular rate and rhythm.   RESP:  Clear to auscultation b/l, no wheezes, rales or rhonchi.  ABD: Normal bowel sounds; soft; non-distended; non-tender; no guarding/ rebound.  MSK: Normal ROM. No clubbing, cyanosis or edema. no ttp bilat le  NEURO: awake, alert, oriented x 3, CN II-XII grossly intact, motor 5/5, no gross sens deficits, speech clear    PSYCH: Cooperative, mood and affect appropriate.

## 2023-03-05 NOTE — ED PROVIDER NOTE - OBJECTIVE STATEMENT
38 yo F h/o hypothyroid, 3 d post partum, s/p epidural c/o severe ha, neck stiffness when standing that resolve when she's laying down.  Pt noted some neck pain after delivery but sx worsened since yest.  Pt feels v unwell as if she can't function when she's standing today.  No fever, change in vision/speech/gait, numbness or weakness in ext, cp, sob, n/v.  No relief w otc meds.

## 2023-03-05 NOTE — PRE-ANESTHESIA EVALUATION ADULT - NSANTHPEFT_GEN_ALL_CORE
Constitutional: Alert & Oriented x3, No acute distress  Respiratory: Clear to ausculation bilaterally; no wheezing, rhonchi, or crackles  Cardiovascular: regular rate and rhythm, no murmurs, or gallops  Gastrointestinal: soft, non tender, no rebound or guarding, incision is healing well, dry and intact  macular rash in the suprapubic area, about 20 cmX8cm, not warm or tender. Looks like contact dermatitis   Extremities: no calf tenderness or swelling, reflexes +2

## 2023-03-05 NOTE — CONSULT NOTE ADULT - SUBJECTIVE AND OBJECTIVE BOX
38 yo , postOP day 3, presenting for worsening HA. The patient had uncomplicated primary CS on 3/2 for arrest of dilation, immediatly postOP she noted that her neck is sore, on POD1 she started to have a HA worse when sitting up/standing and better when lying flat. Initially the pain was manageable with the post op pain meds. Her HA gradually became worse, to the point that she is having difficulty functioning. The patient never had elevated BP, she denies fever, chills, chest pain, SOB, abdominal pain, nausea, vomiting, vision changes. She also endorses suprapubic rash, itching, noted on POD 1, improving.     G1: IUI pregnancy  miscarraige at 8wks w/ pill   G2: IVF pregnancy own egg  GYN Hx: patient had abnormal paps in , ,  s/p colposcopy. Fibroid removal in . Denies hx of STIs. Hx of 4 egg retrieval proceudres.   Surgical Hx: NONE   PMHx: Hypothyroidism dx in    Medications: Levothyroxine 20mg daily, prenatals   Allergies: Sesame; anaphylaxis       PHYSICAL EXAM:   Vital Signs Last 24 Hrs  T(C): 36.4 (05 Mar 2023 11:10), Max: 36.9 (04 Mar 2023 14:00)  T(F): 97.6 (05 Mar 2023 11:10), Max: 98.4 (04 Mar 2023 14:00)  HR: 72 (05 Mar 2023 11:10) (70 - 72)  BP: 118/83 (05 Mar 2023 11:10) (116/75 - 118/83)  RR: 18 (05 Mar 2023 11:10) (18 - 18)  SpO2: 100% (05 Mar 2023 11:10) (97% - 100%)    Parameters below as of 05 Mar 2023 11:10  Patient On (Oxygen Delivery Method): room air      Constitutional: Alert & Oriented x3, No acute distress  Respiratory: Clear to ausculation bilaterally; no wheezing, rhonchi, or crackles  Cardiovascular: regular rate and rhythm, no murmurs, or gallops  Gastrointestinal: soft, non tender, no rebound or guarding, incision is healing well, dry and intact  macular rash in the suprapubic area, about 20 cmX8cm, not warm or tender.  Extremities: no calf tenderness or swelling, reflexes +2      LABS:                        8.3    10.02 )-----------( 197      ( 05 Mar 2023 11:35 )             24.6     03-05    138  |  107  |  8   ----------------------------<  90  4.1   |  24  |  0.58    Ca    8.4      05 Mar 2023 11:35    TPro  5.5<L>  /  Alb  3.0<L>  /  TBili  0.2  /  DBili  x   /  AST  13  /  ALT  13  /  AlkPhos  105  03-05    PT/INR - ( 05 Mar 2023 11:35 )   PT: 9.8 sec;   INR: 0.83          PTT - ( 05 Mar 2023 11:35 )  PTT:28.3 sec       36 yo , postOP day 3, presenting for worsening HA. The patient had uncomplicated primary CS on 3/2 for arrest of dilation, immediatly postOP she noted that her neck is sore, on POD1 she started to have a HA worse when sitting up/standing and better when lying flat. Initially the pain was manageable with the post op pain meds. Her HA gradually became worse, to the point that she is having difficulty functioning. The patient never had elevated BP, she denies fever, chills, chest pain, SOB, abdominal pain, nausea, vomiting, vision changes. She also endorses suprapubic rash, itching, noted on POD 1, improving.     G1: IUI pregnancy  miscarraige at 8wks w/ pill   G2: IVF pregnancy own egg  GYN Hx: patient had abnormal paps in , ,  s/p colposcopy. Fibroid removal in . Denies hx of STIs. Hx of 4 egg retrieval proceudres.   Surgical Hx: NONE   PMHx: Hypothyroidism dx in    Medications: Levothyroxine 20mg daily, prenatals   Allergies: Sesame; anaphylaxis       PHYSICAL EXAM:   Vital Signs Last 24 Hrs  T(C): 36.4 (05 Mar 2023 11:10), Max: 36.9 (04 Mar 2023 14:00)  T(F): 97.6 (05 Mar 2023 11:10), Max: 98.4 (04 Mar 2023 14:00)  HR: 72 (05 Mar 2023 11:10) (70 - 72)  BP: 118/83 (05 Mar 2023 11:10) (116/75 - 118/83)  RR: 18 (05 Mar 2023 11:10) (18 - 18)  SpO2: 100% (05 Mar 2023 11:10) (97% - 100%)    Parameters below as of 05 Mar 2023 11:10  Patient On (Oxygen Delivery Method): room air      Constitutional: Alert & Oriented x3, No acute distress  Respiratory: Clear to ausculation bilaterally; no wheezing, rhonchi, or crackles  Cardiovascular: regular rate and rhythm, no murmurs, or gallops  Gastrointestinal: soft, non tender, no rebound or guarding, incision is healing well, dry and intact  macular rash in the suprapubic area, about 20 cmX8cm, not warm or tender. Looks like contact dermatitis   Extremities: no calf tenderness or swelling, reflexes +2      LABS:                        8.3    10.02 )-----------( 197      ( 05 Mar 2023 11:35 )             24.6     03-05    138  |  107  |  8   ----------------------------<  90  4.1   |  24  |  0.58    Ca    8.4      05 Mar 2023 11:35    TPro  5.5<L>  /  Alb  3.0<L>  /  TBili  0.2  /  DBili  x   /  AST  13  /  ALT  13  /  AlkPhos  105  03-05    PT/INR - ( 05 Mar 2023 11:35 )   PT: 9.8 sec;   INR: 0.83          PTT - ( 05 Mar 2023 11:35 )  PTT:28.3 sec

## 2023-03-05 NOTE — ED PROVIDER NOTE - CLINICAL SUMMARY MEDICAL DECISION MAKING FREE TEXT BOX
Pt post partum w epidural c/o positional ha relieved w laying down, worse w standing c/w spinal ha.  BP wnl, no sig concern for pre-eclampsia related ha.  Plan labs, gyn consult and likely anesthesia consult for blood patch.  Reassess. See progress notes for further mdm related documentation.

## 2023-03-05 NOTE — ED ADULT TRIAGE NOTE - CHIEF COMPLAINT QUOTE
Pt is , 3 days s/p  at 40 weeks and 1 day, c/o h/a. Pt reports pain started as neck pain directly after  and moved to head the next morning. Pt denies vision changes, numbness/tingling, unilateral weakness, pre-eclampsia. Pt talking in clear, full sentences, respirations even and unlabored, neuro intact.

## 2023-03-05 NOTE — PACU DISCHARGE NOTE - COMMENTS
After 2 hours, if the headache remains resolved, the patient can be discharged to home. If not, please reconsult.

## 2023-03-05 NOTE — ED PROVIDER NOTE - NSFOLLOWUPINSTRUCTIONS_ED_ALL_ED_FT
Spinal headache    Drink plenty of fluids.      Return for increased pain, change in behavior, change in vision/speech/gait, numbness or weakness in extremities, vomiting, any other concerns.      Spinal Headache      A spinal headache is a severe headache that can happen after a person has had a lumbar puncture or epidural anesthesia. During these procedures, a needle is passed between the bones of the spine. The headache usually starts from a few hours to 1–2 days after the procedure. The headache can last for a few days. In rare cases, it can last for more than a week.      What are the causes?    This condition is caused by a leak of spinal fluid from the spine through the hole that is left by the needle.      What are the signs or symptoms?    Symptoms of this condition include:  •A severe headache.      •A headache that is worse when you sit or stand and better when you lie down.      •Neck pain and stiffness, especially when tilting your chin toward your chest.      •Nausea and vomiting.        How is this diagnosed?    This condition is usually diagnosed based on:  •Your medical history.      •Your symptoms.      •A CT scan or MRI of the brain to help rule out other conditions.        How is this treated?    Treatment for this condition may include:•Replacing fluids that leaked through the needle hole. Fluids may be replaced by:  •Drinking more fluids.       •Getting fluids through an IV.        •Caffeine to help reduce your headache. Your health care provider may recommend drinking caffeinated beverages such as soda, coffee, or tea.      •Having an epidural blood patch procedure. In this procedure, a small amount of your blood is injected into the area of the leak to seal it.      •Pain medicines.      •Resting and lying flat for a few days.        Follow these instructions at home:      Cups of hot coffee and tea.        A comparison of three sample cups showing dark yellow, yellow, and pale yellow urine.     •Take over-the-counter and prescription medicines only as told by your health care provider.      •Drink enough fluid to keep your urine pale yellow.      •Drink caffeinated beverages as told by your health care provider.      •Lie down to relieve pain if your pain gets worse when you sit or stand.      •Return to your normal activities as told by your health care provider. Ask your health care provider what activities are safe for you.      •Keep all follow-up visits. This is important.        Contact a health care provider if:    •You are nauseous and vomit.        Get help right away if:    •Your pain becomes very severe.      •Your pain cannot be controlled.      •You develop a fever.      •You have a stiff neck.      •You have vision problems.      •You lose control of your bowel or bladder (have incontinence).      •You have trouble walking, you feel weak, or you lose feeling in part of your body.        Summary    •A spinal headache is a severe headache that can happen after a person has had a lumbar puncture or epidural anesthesia.      •This condition is caused by a leak of spinal fluid from the spine through the hole that is left by the needle. The headache can last for a few days. In rare cases, it lasts for more than a week.      •Supportive measures, such as drinking more fluid and taking pain medicines, are usually recommended. In some cases, it may be necessary to inject a small amount of your blood into the area of the leak to seal it.

## 2023-03-05 NOTE — CONSULT NOTE ADULT - ASSESSMENT
38 yo , postOP day 3, presenting for worsening ZARATE. Gyn consulted to r/o PEC. The patient is normotensive and stable, she denies any other toxic symptoms. The patient is normotensive in the ED and has no hx of elevated BPs in the pregnancy/labor. Full labs are wnl.   It is not likely that the patient has preeclampsia. However, she has worsening HA, improved when lying down. We would recommend anesthesia consult to r/o spinal HA.   Thank you.     D/w Dr. Diaz 36 yo , postOP day 3, presenting for worsening ZARATE. Gyn consulted to r/o PEC. The patient is normotensive and stable, she denies any other toxic symptoms. The patient is normotensive in the ED and has no hx of elevated BPs in the pregnancy/labor. Full labs are wnl.   It is not likely that the patient has preeclampsia. However, she has worsening HA, improved when lying down. We would recommend anesthesia consult to r/o spinal HA. IV fluids. For the suprapubic rash, which looks like contact dermatitis she can have low potency steroid ointment.   Thank you.     D/w Dr. Diaz

## 2023-03-05 NOTE — ED PROVIDER NOTE - PROGRESS NOTE DETAILS
Pt seen by and discussed w gyn - agree w likely spinal ha.  Labs wnl, anesthesia paged for blood patch and now in ed to perform. Blood patch performed by anesthesia ~ 2p; pt re-eval after 2 h per their request - reports ha much better but still felt uncomfortable sitting up.  Anesthesia paged to update and will come to ed to re-eval. Eval and cleared by anesthesia for dc.  Standing and walking in ed w/o difficulty or severe sx.  Will dc.

## 2023-03-05 NOTE — ED PROVIDER NOTE - PATIENT PORTAL LINK FT
You can access the FollowMyHealth Patient Portal offered by [facility] by registering at the following website: [email or web address]. By joining [email or web address] FollowMyHealth portal, you will also be able to view your health information using other applications (apps) compatible with our system.

## 2023-03-05 NOTE — ED ADULT NURSE NOTE - OBJECTIVE STATEMENT
Patient arrived to the ER c/o headaches and neck pain for the past 24 hours as per pt. Pt is 3xdays s/p  at 40 weeks and 1 day. Pt denies dizziness, SOB, CP and denies any other medical complaints at this moment. Pt is noted to be aox3, in no acute distress, able to maintain airway, having non labored breathing, no retractions noted, non diaphoretic and able to talk in clear full sentences. IV access was established and labs were drawn and sent ;

## 2023-03-08 DIAGNOSIS — O48.0 POST-TERM PREGNANCY: ICD-10-CM

## 2023-03-08 DIAGNOSIS — E03.9 HYPOTHYROIDISM, UNSPECIFIED: ICD-10-CM

## 2023-03-08 DIAGNOSIS — N87.9 DYSPLASIA OF CERVIX UTERI, UNSPECIFIED: ICD-10-CM

## 2023-03-08 DIAGNOSIS — Z34.83 ENCOUNTER FOR SUPERVISION OF OTHER NORMAL PREGNANCY, THIRD TRIMESTER: ICD-10-CM

## 2023-03-08 DIAGNOSIS — D62 ACUTE POSTHEMORRHAGIC ANEMIA: ICD-10-CM

## 2023-03-08 DIAGNOSIS — Z3A.40 40 WEEKS GESTATION OF PREGNANCY: ICD-10-CM

## 2023-03-08 DIAGNOSIS — R87.820 CERVICAL LOW RISK HUMAN PAPILLOMAVIRUS (HPV) DNA TEST POSITIVE: ICD-10-CM

## 2023-03-08 DIAGNOSIS — O34.43 MATERNAL CARE FOR OTHER ABNORMALITIES OF CERVIX, THIRD TRIMESTER: ICD-10-CM

## 2023-03-09 ENCOUNTER — TRANSCRIPTION ENCOUNTER (OUTPATIENT)
Age: 38
End: 2023-03-09

## 2023-03-09 ENCOUNTER — APPOINTMENT (OUTPATIENT)
Dept: INTERNAL MEDICINE | Facility: CLINIC | Age: 38
End: 2023-03-09
Payer: COMMERCIAL

## 2023-03-09 ENCOUNTER — LABORATORY RESULT (OUTPATIENT)
Age: 38
End: 2023-03-09

## 2023-03-09 PROBLEM — E03.9 HYPOTHYROIDISM, UNSPECIFIED: Chronic | Status: ACTIVE | Noted: 2023-03-05

## 2023-03-09 PROCEDURE — 36415 COLL VENOUS BLD VENIPUNCTURE: CPT

## 2023-03-09 PROCEDURE — 99213 OFFICE O/P EST LOW 20 MIN: CPT | Mod: 25

## 2023-03-09 NOTE — PLAN
[FreeTextEntry1] : Mod severe Bell's palsy\par start pred 60mg and valacyclovir 1 g tid \par with breast feeding- acceptable but some passes so advised d/w her pediatrician as well\par will set up neuro eval\par reviewed prognosis

## 2023-03-09 NOTE — REVIEW OF SYSTEMS
[Pain] : no pain [Redness] : no redness [Itching] : no itching [Earache] : no earache [Hearing Loss] : no hearing loss [Nasal Discharge] : no nasal discharge [Sore Throat] : no sore throat [Headache] : no headache [Dizziness] : no dizziness [Fainting] : no fainting [Memory Loss] : no memory loss [Unsteady Walking] : no ataxia [Negative] : Psychiatric [FreeTextEntry3] : tearing right side

## 2023-03-09 NOTE — HISTORY OF PRESENT ILLNESS
[de-identified] : s/p delivery. epidural one week ago.  Had a stiff neck. Developed a bad headache.- thought to be a spinal headache- epidural blood patch performed which helped significantly.  Then noticed some facial drooping on 3.6.

## 2023-03-09 NOTE — PHYSICAL EXAM
[No Acute Distress] : no acute distress [Normal Sclera/Conjunctiva] : normal sclera/conjunctiva [PERRL] : pupils equal round and reactive to light [EOMI] : extraocular movements intact [Normal Oropharynx] : the oropharynx was normal [de-identified] : r [de-identified] : right facial weakness forehead to mouth.  able to close eye completely

## 2023-03-13 ENCOUNTER — APPOINTMENT (OUTPATIENT)
Dept: NEUROLOGY | Facility: CLINIC | Age: 38
End: 2023-03-13
Payer: COMMERCIAL

## 2023-03-13 ENCOUNTER — NON-APPOINTMENT (OUTPATIENT)
Age: 38
End: 2023-03-13

## 2023-03-13 VITALS
OXYGEN SATURATION: 98 % | WEIGHT: 126 LBS | SYSTOLIC BLOOD PRESSURE: 105 MMHG | BODY MASS INDEX: 20.25 KG/M2 | DIASTOLIC BLOOD PRESSURE: 66 MMHG | HEART RATE: 89 BPM | HEIGHT: 66 IN | TEMPERATURE: 98.2 F

## 2023-03-13 DIAGNOSIS — G51.0 BELL'S PALSY: ICD-10-CM

## 2023-03-13 PROCEDURE — 99204 OFFICE O/P NEW MOD 45 MIN: CPT

## 2023-03-13 NOTE — HISTORY OF PRESENT ILLNESS
[FreeTextEntry1] : 36 yo F presents for right sided bells palsy\par \par Delivered baby girl 3/2/2023 by c section. No complications during pregnancy. Did have a post epidural headache (received blood patch)\par Patient noticed symptoms on night 3/5 or 3/6. Right facial weakness involving forehead and mouth. Denies change in taste, sensation or vision. Able to close eye.  \par Saw PMD 3/9 and started meds that morning.\par \par No FHx of neurologic condition\par Borrelia burgdorferi ab for lyme negative\par

## 2023-03-13 NOTE — DISCUSSION/SUMMARY
[FreeTextEntry1] : 38 yo F presents for right sided bells palsy\par \par plan;\par Agree with prednisone and valacyclovir as prescribed \par Counseled on importance of eye care (artificial tears, eye protection)\par Counseled on variable recovery time\par No indication for MRI brain at this time- will obtain if new or worsening symptoms \par

## 2023-03-13 NOTE — PHYSICAL EXAM
Delivery Discharge Summary  Obstetrics      Primary OB Clinician: Brook Flanagan MD      Admission date: 2022  Discharge date: 2022    Disposition: To home, self care    Discharge Diagnosis List:      Patient Active Problem List   Diagnosis    Benign neuroma    Lipoma NEC    Sinus bradycardia    Anxiety    Thyroid nodule    Encounter for supervision of normal pregnancy in second trimester    GBS (group B Streptococcus carrier), +RV culture, currently pregnant    Spontaneous vaginal delivery       Procedure:     Hospital Course:  Zeinab Hobbs is a 30 y.o. now , PPD #1 who was admitted on 2022 at 37w4d for PROM. Patient was subsequently admitted to labor and delivery unit with signed consents.     Labor course was uncomplicated and resulted in  complicated by uterine atony requiring cytotec 800mcg ND to achieve hemostasis,  cc.     Please see delivery note for further details. Her postpartum course was uncomplicated. On discharge day, patient's pain is controlled with oral pain medications. Pt is tolerating ambulation without SOB or CP, and regular diet without N/V. Reports lochia is mild. Denies any HA, vision changes, F/C, LE swelling. Denies any breast pain/soreness.    Pt in stable condition and ready for discharge. She has been instructed to start and/or continue medications and follow up with her obstetrics provider as listed below.    Pertinent studies:  CBC  Recent Labs   Lab 22  1603 22  0429   WBC 7.37 9.12   HGB 11.0* 9.7*   HCT 32.1* 28.7*   MCV 93 93    173        Immunization History   Administered Date(s) Administered    DTaP 1993, 1993, 1993, 1994, 1997    HIB 1993, 1993, 1993, 1994    HPV Quadrivalent 2007, 2007, 2008    Hepatitis B, Pediatric/Adolescent 1992, 1993, 1994    IPV 1993, 1993, 1993, 1994,  1997    MMR 1994, 1997    Meningococcal Conjugate (MCV4P) 2005    PPD Test 1994, 2001, 2004    Td (ADULT) 2005    Tdap 2010, 2022        Delivery:    Episiotomy: None   Lacerations: 2nd   Repair suture:     Repair # of packets: 1   Blood loss (ml):       Birth information:  YOB: 2021   Time of birth: 9:36 AM   Sex: female   Delivery type: Vaginal, Spontaneous   Gestational Age: 37w4d    Delivery Clinician:      Other providers:       Additional  information:  Forceps:    Vacuum:    Breech:    Observed anomalies      Living?:           APGARS  One minute Five minutes Ten minutes   Skin color:         Heart rate:         Grimace:         Muscle tone:         Breathing:         Totals: 8  9        Placenta: Delivered:       appearance      Patient Instructions:   Current Discharge Medication List      START taking these medications    Details   ibuprofen (ADVIL,MOTRIN) 600 MG tablet Take 1 tablet (600 mg total) by mouth every 6 (six) hours. Alternate between ibuprofen and tylenol every 3 hours. For example: @0800: ibuprofen 600mg @1100: tylenol 650mg @1400: ibuprofen 600mg @1700: tylenol 650 mg @2000: ibuprofen 600mg  Qty: 120 tablet, Refills: 0      senna-docusate 8.6-50 mg (SENNA WITH DOCUSATE SODIUM) 8.6-50 mg per tablet Take 1 tablet by mouth once daily.  Qty: 30 tablet, Refills: 0         CONTINUE these medications which have NOT CHANGED    Details   EScitalopram oxalate (LEXAPRO) 5 MG Tab Take 1 tablet (5 mg total) by mouth once daily.  Qty: 90 tablet, Refills: 3      FINACEA 15 % Foam SMARTSIG:Sparingly Topical Twice Daily             Discharge Procedure Orders   Diet Adult Regular     Lifting restrictions   Order Comments: If you had a  section, no lifting greater than 10lbs for 6 weeks.     Pelvic Rest     Notify your health care provider if you experience any of the following:  temperature >100.4     Notify your health  care provider if you experience any of the following:  persistent nausea and vomiting or diarrhea     Notify your health care provider if you experience any of the following:  severe uncontrolled pain     Notify your health care provider if you experience any of the following:  redness, tenderness, or signs of infection (pain, swelling, redness, odor or green/yellow discharge around incision site)     Notify your health care provider if you experience any of the following:  difficulty breathing or increased cough     Notify your health care provider if you experience any of the following:  severe persistent headache     Notify your health care provider if you experience any of the following:  worsening rash     Notify your health care provider if you experience any of the following:  persistent dizziness, light-headedness, or visual disturbances     Notify your health care provider if you experience any of the following:  increased confusion or weakness     Notify your health care provider if you experience any of the following:   Scheduling Instructions: Vaginal bleeding more than 1 fully soaked pad per hour for 2 hours.     Activity as tolerated        Follow-up Information     Brook Flanagan MD Follow up in 6 day(s).    Specialty: Obstetrics and Gynecology  Why: Post partum f/u  Contact information:  [address] [address]  [phone]                          Sarah Vigil M.D.  OB/GYN PGY-4   [General Appearance - Alert] : alert [General Appearance - In No Acute Distress] : in no acute distress [Oriented To Time, Place, And Person] : oriented to person, place, and time [Impaired Insight] : insight and judgment were intact [Person] : oriented to person [Place] : oriented to place [Time] : oriented to time [Fluency] : fluency intact [Cranial Nerves Optic (II)] : visual acuity intact bilaterally,  visual fields full to confrontation, pupils equal round and reactive to light [Cranial Nerves Oculomotor (III)] : extraocular motion intact [Cranial Nerves Trigeminal (V)] : facial sensation intact symmetrically [Cranial Nerves Vestibulocochlear (VIII)] : hearing was intact bilaterally [Cranial Nerves Glossopharyngeal (IX)] : tongue and palate midline [Cranial Nerves Accessory (XI - Cranial And Spinal)] : head turning and shoulder shrug symmetric [Cranial Nerves Hypoglossal (XII)] : there was no tongue deviation with protrusion [Cranial Nerves Right Facial: House Grade ___(1-6)] : grade III (moderate, 60%) facial nerve function [Motor Tone] : muscle tone was normal in all four extremities [Motor Strength] : muscle strength was normal in all four extremities [Abnormal Walk] : normal gait [Balance] : balance was intact [Coordination - Dysmetria Impaired Finger-to-Nose Bilateral] : not present [2+] : Ankle jerk left 2+ [FreeTextEntry5] : able to close right eye completely

## 2023-03-20 ENCOUNTER — TRANSCRIPTION ENCOUNTER (OUTPATIENT)
Age: 38
End: 2023-03-20

## 2023-03-21 ENCOUNTER — TRANSCRIPTION ENCOUNTER (OUTPATIENT)
Age: 38
End: 2023-03-21

## 2023-06-23 ENCOUNTER — TRANSCRIPTION ENCOUNTER (OUTPATIENT)
Age: 38
End: 2023-06-23

## 2023-06-23 DIAGNOSIS — E03.9 HYPOTHYROIDISM, UNSPECIFIED: ICD-10-CM

## 2023-06-23 RX ORDER — LEVOTHYROXINE SODIUM 0.05 MG/1
50 TABLET ORAL
Qty: 30 | Refills: 5 | Status: ACTIVE | COMMUNITY
Start: 2023-06-23 | End: 1900-01-01

## 2023-07-17 ENCOUNTER — NON-APPOINTMENT (OUTPATIENT)
Age: 38
End: 2023-07-17

## 2023-07-17 ENCOUNTER — APPOINTMENT (OUTPATIENT)
Dept: INTERNAL MEDICINE | Facility: CLINIC | Age: 38
End: 2023-07-17
Payer: COMMERCIAL

## 2023-07-17 VITALS
HEIGHT: 66 IN | OXYGEN SATURATION: 99 % | BODY MASS INDEX: 20.09 KG/M2 | TEMPERATURE: 98.3 F | SYSTOLIC BLOOD PRESSURE: 101 MMHG | WEIGHT: 125 LBS | HEART RATE: 77 BPM | RESPIRATION RATE: 16 BRPM | DIASTOLIC BLOOD PRESSURE: 77 MMHG

## 2023-07-17 DIAGNOSIS — Z00.00 ENCOUNTER FOR GENERAL ADULT MEDICAL EXAMINATION W/OUT ABNORMAL FINDINGS: ICD-10-CM

## 2023-07-17 PROCEDURE — 99395 PREV VISIT EST AGE 18-39: CPT

## 2023-07-17 RX ORDER — VALACYCLOVIR 1 G/1
1 TABLET, FILM COATED ORAL 3 TIMES DAILY
Qty: 21 | Refills: 0 | Status: DISCONTINUED | COMMUNITY
Start: 2023-03-09 | End: 2023-07-17

## 2023-07-17 RX ORDER — PREDNISONE 20 MG/1
20 TABLET ORAL
Qty: 21 | Refills: 0 | Status: DISCONTINUED | COMMUNITY
Start: 2023-03-09 | End: 2023-07-17

## 2023-07-17 NOTE — PLAN
[FreeTextEntry1] : wellness compelte\par labs   tsh to be done with fertility specialist\par tinnitus reviewed in detail

## 2023-07-17 NOTE — REVIEW OF SYSTEMS
[Pain] : no pain [Redness] : no redness [Itching] : no itching [Earache] : no earache [Hearing Loss] : no hearing loss [Nasal Discharge] : no nasal discharge [Sore Throat] : no sore throat [Headache] : no headache [Dizziness] : no dizziness [Fainting] : no fainting [Memory Loss] : no memory loss [Unsteady Walking] : no ataxia [Negative] : Heme/Lymph [FreeTextEntry4] : as above

## 2023-07-17 NOTE — HISTORY OF PRESENT ILLNESS
[de-identified] : 38 yo f with hypothyroidism\par  s/p Bell's palsy,  completely resolved.\par 4 mo old at home doing well\par \par \par has right sided tinnitus -

## 2023-07-24 ENCOUNTER — TRANSCRIPTION ENCOUNTER (OUTPATIENT)
Age: 38
End: 2023-07-24

## 2023-07-24 DIAGNOSIS — Z91.018 ALLERGY TO OTHER FOODS: ICD-10-CM

## 2023-07-24 RX ORDER — EPINEPHRINE 0.3 MG/.3ML
0.3 INJECTION INTRAMUSCULAR
Qty: 2 | Refills: 0 | Status: ACTIVE | COMMUNITY
Start: 2023-07-24 | End: 1900-01-01

## 2025-01-28 ENCOUNTER — OUTPATIENT (OUTPATIENT)
Dept: OUTPATIENT SERVICES | Facility: HOSPITAL | Age: 40
LOS: 1 days | End: 2025-01-28
Payer: COMMERCIAL

## 2025-01-28 DIAGNOSIS — Z01.818 ENCOUNTER FOR OTHER PREPROCEDURAL EXAMINATION: ICD-10-CM

## 2025-01-28 LAB
BLD GP AB SCN SERPL QL: POSITIVE — SIGNIFICANT CHANGE UP
HCT VFR BLD CALC: 36.6 % — SIGNIFICANT CHANGE UP (ref 34.5–45)
HGB BLD-MCNC: 12 G/DL — SIGNIFICANT CHANGE UP (ref 11.5–15.5)
MCHC RBC-ENTMCNC: 30.5 PG — SIGNIFICANT CHANGE UP (ref 27–34)
MCHC RBC-ENTMCNC: 32.8 G/DL — SIGNIFICANT CHANGE UP (ref 32–36)
MCV RBC AUTO: 92.9 FL — SIGNIFICANT CHANGE UP (ref 80–100)
NRBC # BLD: 0 /100 WBCS — SIGNIFICANT CHANGE UP (ref 0–0)
NRBC BLD-RTO: 0 /100 WBCS — SIGNIFICANT CHANGE UP (ref 0–0)
PLATELET # BLD AUTO: 168 K/UL — SIGNIFICANT CHANGE UP (ref 150–400)
RBC # BLD: 3.94 M/UL — SIGNIFICANT CHANGE UP (ref 3.8–5.2)
RBC # FLD: 13.6 % — SIGNIFICANT CHANGE UP (ref 10.3–14.5)
RH IG SCN BLD-IMP: NEGATIVE — SIGNIFICANT CHANGE UP
WBC # BLD: 8.06 K/UL — SIGNIFICANT CHANGE UP (ref 3.8–10.5)
WBC # FLD AUTO: 8.06 K/UL — SIGNIFICANT CHANGE UP (ref 3.8–10.5)

## 2025-01-28 PROCEDURE — 86077 PHYS BLOOD BANK SERV XMATCH: CPT

## 2025-01-28 PROCEDURE — 86900 BLOOD TYPING SEROLOGIC ABO: CPT

## 2025-01-28 PROCEDURE — 85027 COMPLETE CBC AUTOMATED: CPT

## 2025-01-28 PROCEDURE — 86850 RBC ANTIBODY SCREEN: CPT

## 2025-01-28 PROCEDURE — 86880 COOMBS TEST DIRECT: CPT

## 2025-01-28 PROCEDURE — 86901 BLOOD TYPING SEROLOGIC RH(D): CPT

## 2025-01-28 PROCEDURE — 86870 RBC ANTIBODY IDENTIFICATION: CPT

## 2025-01-28 PROCEDURE — 86780 TREPONEMA PALLIDUM: CPT

## 2025-01-29 LAB — T PALLIDUM AB TITR SER: NEGATIVE — SIGNIFICANT CHANGE UP

## 2025-01-30 ENCOUNTER — INPATIENT (INPATIENT)
Facility: HOSPITAL | Age: 40
LOS: 1 days | Discharge: HOME | End: 2025-02-01
Attending: OBSTETRICS & GYNECOLOGY | Admitting: OBSTETRICS & GYNECOLOGY
Payer: COMMERCIAL

## 2025-01-30 ENCOUNTER — TRANSCRIPTION ENCOUNTER (OUTPATIENT)
Age: 40
End: 2025-01-30

## 2025-01-30 VITALS
DIASTOLIC BLOOD PRESSURE: 62 MMHG | SYSTOLIC BLOOD PRESSURE: 101 MMHG | WEIGHT: 156.97 LBS | HEIGHT: 66 IN | RESPIRATION RATE: 16 BRPM | HEART RATE: 69 BPM | TEMPERATURE: 98 F | OXYGEN SATURATION: 100 %

## 2025-01-30 DIAGNOSIS — Z98.891 HISTORY OF UTERINE SCAR FROM PREVIOUS SURGERY: Chronic | ICD-10-CM

## 2025-01-30 RX ORDER — KETOROLAC TROMETHAMINE 30 MG/ML
30 INJECTION, SOLUTION INTRAMUSCULAR; INTRAVENOUS EVERY 6 HOURS
Refills: 0 | Status: DISCONTINUED | OUTPATIENT
Start: 2025-01-30 | End: 2025-01-31

## 2025-01-30 RX ORDER — CEFAZOLIN SODIUM IN 0.9 % NACL 3 G/100 ML
2000 INTRAVENOUS SOLUTION, PIGGYBACK (ML) INTRAVENOUS ONCE
Refills: 0 | Status: COMPLETED | OUTPATIENT
Start: 2025-01-30 | End: 2025-01-30

## 2025-01-30 RX ORDER — SODIUM CHLORIDE 9 G/1000ML
1000 INJECTION, SOLUTION INTRAVENOUS
Refills: 0 | Status: DISCONTINUED | OUTPATIENT
Start: 2025-01-30 | End: 2025-02-01

## 2025-01-30 RX ORDER — ACETAMINOPHEN 500 MG/5ML
975 LIQUID (ML) ORAL
Refills: 0 | Status: DISCONTINUED | OUTPATIENT
Start: 2025-01-30 | End: 2025-02-01

## 2025-01-30 RX ORDER — DEXAMETHASONE 0.5 MG/1
4 TABLET ORAL EVERY 6 HOURS
Refills: 0 | Status: DISCONTINUED | OUTPATIENT
Start: 2025-01-30 | End: 2025-02-01

## 2025-01-30 RX ORDER — SIMETHICONE 80 MG
80 TABLET,CHEWABLE ORAL EVERY 4 HOURS
Refills: 0 | Status: DISCONTINUED | OUTPATIENT
Start: 2025-01-30 | End: 2025-02-01

## 2025-01-30 RX ORDER — ACETAMINOPHEN 500 MG/5ML
1000 LIQUID (ML) ORAL ONCE
Refills: 0 | Status: COMPLETED | OUTPATIENT
Start: 2025-01-30 | End: 2025-01-30

## 2025-01-30 RX ORDER — DIPHENHYDRAMINE HCL 12.5MG/5ML
25 ELIXIR ORAL EVERY 6 HOURS
Refills: 0 | Status: DISCONTINUED | OUTPATIENT
Start: 2025-01-30 | End: 2025-02-01

## 2025-01-30 RX ORDER — ONDANSETRON HCL/PF 4 MG/2 ML
4 VIAL (ML) INJECTION EVERY 6 HOURS
Refills: 0 | Status: DISCONTINUED | OUTPATIENT
Start: 2025-01-30 | End: 2025-02-01

## 2025-01-30 RX ORDER — MODIFIED LANOLIN 100 %
1 CREAM (GRAM) TOPICAL EVERY 6 HOURS
Refills: 0 | Status: DISCONTINUED | OUTPATIENT
Start: 2025-01-30 | End: 2025-02-01

## 2025-01-30 RX ORDER — OXYCODONE HYDROCHLORIDE 30 MG/1
5 TABLET ORAL ONCE
Refills: 0 | Status: DISCONTINUED | OUTPATIENT
Start: 2025-01-30 | End: 2025-02-01

## 2025-01-30 RX ORDER — OXYCODONE HYDROCHLORIDE 30 MG/1
5 TABLET ORAL
Refills: 0 | Status: COMPLETED | OUTPATIENT
Start: 2025-01-30 | End: 2025-02-06

## 2025-01-30 RX ORDER — MAGNESIUM HYDROXIDE 400 MG/5ML
30 SUSPENSION ORAL
Refills: 0 | Status: DISCONTINUED | OUTPATIENT
Start: 2025-01-30 | End: 2025-02-01

## 2025-01-30 RX ORDER — NALOXONE HYDROCHLORIDE 0.4 MG/ML
0.1 INJECTION, SOLUTION INTRAMUSCULAR; INTRAVENOUS; SUBCUTANEOUS
Refills: 0 | Status: DISCONTINUED | OUTPATIENT
Start: 2025-01-30 | End: 2025-02-01

## 2025-01-30 RX ORDER — OXYTOCIN-SODIUM CHLORIDE 0.9% IV SOLN 30 UNIT/500ML 30-0.9/5 UT/ML-%
42 SOLUTION INTRAVENOUS
Qty: 30 | Refills: 0 | Status: DISCONTINUED | OUTPATIENT
Start: 2025-01-30 | End: 2025-02-01

## 2025-01-30 RX ORDER — CITRIC ACID/SODIUM CITRATE 300-500 MG
30 SOLUTION, ORAL ORAL ONCE
Refills: 0 | Status: DISCONTINUED | OUTPATIENT
Start: 2025-01-30 | End: 2025-02-01

## 2025-01-30 RX ORDER — IBUPROFEN 200 MG
600 TABLET ORAL EVERY 6 HOURS
Refills: 0 | Status: COMPLETED | OUTPATIENT
Start: 2025-01-30 | End: 2025-12-29

## 2025-01-30 RX ADMIN — KETOROLAC TROMETHAMINE 30 MILLIGRAM(S): 30 INJECTION, SOLUTION INTRAMUSCULAR; INTRAVENOUS at 15:19

## 2025-01-30 RX ADMIN — Medication 100 MILLIGRAM(S): at 10:59

## 2025-01-30 RX ADMIN — SODIUM CHLORIDE 125 MILLILITER(S): 9 INJECTION, SOLUTION INTRAVENOUS at 11:08

## 2025-01-30 RX ADMIN — Medication 400 MILLIGRAM(S): at 13:31

## 2025-01-30 RX ADMIN — Medication 975 MILLIGRAM(S): at 20:37

## 2025-01-31 ENCOUNTER — TRANSCRIPTION ENCOUNTER (OUTPATIENT)
Age: 40
End: 2025-01-31

## 2025-01-31 LAB
BASOPHILS # BLD AUTO: 0.04 K/UL — SIGNIFICANT CHANGE UP (ref 0–0.2)
BASOPHILS NFR BLD AUTO: 0.3 % — SIGNIFICANT CHANGE UP (ref 0–2)
EOSINOPHIL # BLD AUTO: 0.05 K/UL — SIGNIFICANT CHANGE UP (ref 0–0.5)
EOSINOPHIL NFR BLD AUTO: 0.4 % — SIGNIFICANT CHANGE UP (ref 0–6)
HCT VFR BLD CALC: 23.7 % — LOW (ref 34.5–45)
HGB BLD-MCNC: 7.9 G/DL — LOW (ref 11.5–15.5)
IMM GRANULOCYTES NFR BLD AUTO: 0.7 % — SIGNIFICANT CHANGE UP (ref 0–0.9)
LYMPHOCYTES # BLD AUTO: 19.6 % — SIGNIFICANT CHANGE UP (ref 13–44)
LYMPHOCYTES # BLD AUTO: 2.69 K/UL — SIGNIFICANT CHANGE UP (ref 1–3.3)
MCHC RBC-ENTMCNC: 31.3 PG — SIGNIFICANT CHANGE UP (ref 27–34)
MCHC RBC-ENTMCNC: 33.3 G/DL — SIGNIFICANT CHANGE UP (ref 32–36)
MCV RBC AUTO: 94 FL — SIGNIFICANT CHANGE UP (ref 80–100)
MONOCYTES # BLD AUTO: 1.03 K/UL — HIGH (ref 0–0.9)
MONOCYTES NFR BLD AUTO: 7.5 % — SIGNIFICANT CHANGE UP (ref 2–14)
NEUTROPHILS # BLD AUTO: 9.83 K/UL — HIGH (ref 1.8–7.4)
NEUTROPHILS NFR BLD AUTO: 71.5 % — SIGNIFICANT CHANGE UP (ref 43–77)
NRBC # BLD: 0 /100 WBCS — SIGNIFICANT CHANGE UP (ref 0–0)
NRBC BLD-RTO: 0 /100 WBCS — SIGNIFICANT CHANGE UP (ref 0–0)
PLATELET # BLD AUTO: 123 K/UL — LOW (ref 150–400)
RBC # BLD: 2.52 M/UL — LOW (ref 3.8–5.2)
RBC # FLD: 13.9 % — SIGNIFICANT CHANGE UP (ref 10.3–14.5)
WBC # BLD: 13.74 K/UL — HIGH (ref 3.8–10.5)
WBC # FLD AUTO: 13.74 K/UL — HIGH (ref 3.8–10.5)

## 2025-01-31 RX ORDER — IBUPROFEN 200 MG
600 TABLET ORAL EVERY 6 HOURS
Refills: 0 | Status: DISCONTINUED | OUTPATIENT
Start: 2025-01-31 | End: 2025-02-01

## 2025-01-31 RX ORDER — ACETAMINOPHEN 500 MG/5ML
3 LIQUID (ML) ORAL
Qty: 0 | Refills: 0 | DISCHARGE
Start: 2025-01-31

## 2025-01-31 RX ORDER — IBUPROFEN 200 MG
1 TABLET ORAL
Qty: 0 | Refills: 0 | DISCHARGE
Start: 2025-01-31

## 2025-01-31 RX ORDER — OXYCODONE HYDROCHLORIDE 30 MG/1
5 TABLET ORAL
Refills: 0 | Status: DISCONTINUED | OUTPATIENT
Start: 2025-01-31 | End: 2025-02-01

## 2025-01-31 RX ORDER — IRON SUCROSE 20 MG/ML
500 INJECTION, SOLUTION INTRAVENOUS EVERY 24 HOURS
Refills: 0 | Status: COMPLETED | OUTPATIENT
Start: 2025-01-31 | End: 2025-02-01

## 2025-01-31 RX ADMIN — Medication 975 MILLIGRAM(S): at 15:19

## 2025-01-31 RX ADMIN — Medication 600 MILLIGRAM(S): at 18:52

## 2025-01-31 RX ADMIN — KETOROLAC TROMETHAMINE 30 MILLIGRAM(S): 30 INJECTION, SOLUTION INTRAMUSCULAR; INTRAVENOUS at 00:00

## 2025-01-31 RX ADMIN — IRON SUCROSE 68.75 MILLIGRAM(S): 20 INJECTION, SOLUTION INTRAVENOUS at 11:09

## 2025-01-31 RX ADMIN — Medication 975 MILLIGRAM(S): at 02:33

## 2025-01-31 RX ADMIN — Medication 600 MILLIGRAM(S): at 18:19

## 2025-01-31 RX ADMIN — OXYCODONE HYDROCHLORIDE 5 MILLIGRAM(S): 30 TABLET ORAL at 21:48

## 2025-01-31 RX ADMIN — KETOROLAC TROMETHAMINE 30 MILLIGRAM(S): 30 INJECTION, SOLUTION INTRAMUSCULAR; INTRAVENOUS at 05:52

## 2025-01-31 RX ADMIN — Medication 975 MILLIGRAM(S): at 14:02

## 2025-01-31 RX ADMIN — Medication 975 MILLIGRAM(S): at 20:37

## 2025-01-31 RX ADMIN — OXYCODONE HYDROCHLORIDE 5 MILLIGRAM(S): 30 TABLET ORAL at 21:18

## 2025-02-01 VITALS
OXYGEN SATURATION: 98 % | RESPIRATION RATE: 18 BRPM | SYSTOLIC BLOOD PRESSURE: 104 MMHG | TEMPERATURE: 99 F | HEART RATE: 99 BPM | DIASTOLIC BLOOD PRESSURE: 64 MMHG

## 2025-02-01 PROCEDURE — 59050 FETAL MONITOR W/REPORT: CPT

## 2025-02-01 PROCEDURE — 85025 COMPLETE CBC W/AUTO DIFF WBC: CPT

## 2025-02-01 RX ADMIN — Medication 600 MILLIGRAM(S): at 05:13

## 2025-02-01 RX ADMIN — Medication 600 MILLIGRAM(S): at 11:50

## 2025-02-01 RX ADMIN — Medication 975 MILLIGRAM(S): at 09:22

## 2025-02-01 RX ADMIN — Medication 975 MILLIGRAM(S): at 02:04

## 2025-02-06 DIAGNOSIS — Z3A.38 38 WEEKS GESTATION OF PREGNANCY: ICD-10-CM

## 2025-02-06 DIAGNOSIS — O34.211 MATERNAL CARE FOR LOW TRANSVERSE SCAR FROM PREVIOUS CESAREAN DELIVERY: ICD-10-CM

## 2025-02-06 DIAGNOSIS — E03.8 OTHER SPECIFIED HYPOTHYROIDISM: ICD-10-CM

## 2025-02-06 DIAGNOSIS — Z28.09 IMMUNIZATION NOT CARRIED OUT BECAUSE OF OTHER CONTRAINDICATION: ICD-10-CM

## 2025-02-06 DIAGNOSIS — Z91.018 ALLERGY TO OTHER FOODS: ICD-10-CM
